# Patient Record
Sex: FEMALE | URBAN - METROPOLITAN AREA
[De-identification: names, ages, dates, MRNs, and addresses within clinical notes are randomized per-mention and may not be internally consistent; named-entity substitution may affect disease eponyms.]

---

## 2020-01-30 ENCOUNTER — NEW PATIENT (OUTPATIENT)
Dept: URBAN - METROPOLITAN AREA CLINIC 56 | Facility: CLINIC | Age: 60
End: 2020-01-30
Payer: COMMERCIAL

## 2020-01-30 DIAGNOSIS — Z83.511 FAMILY HISTORY OF GLAUCOMA: ICD-10-CM

## 2020-01-30 PROCEDURE — 92004 COMPRE OPH EXAM NEW PT 1/>: CPT | Performed by: OPTOMETRIST

## 2020-01-30 PROCEDURE — 92250 FUNDUS PHOTOGRAPHY W/I&R: CPT | Performed by: OPTOMETRIST

## 2020-01-30 PROCEDURE — 76514 ECHO EXAM OF EYE THICKNESS: CPT | Performed by: OPTOMETRIST

## 2020-01-30 ASSESSMENT — KERATOMETRY
OS: 45.70
OD: 45.43

## 2020-01-30 ASSESSMENT — INTRAOCULAR PRESSURE
OS: 10
OD: 10

## 2020-01-30 ASSESSMENT — VISUAL ACUITY
OD: 20/20
OS: 20/20

## 2020-09-11 ENCOUNTER — FOLLOW UP ESTABLISHED (OUTPATIENT)
Dept: URBAN - METROPOLITAN AREA CLINIC 56 | Facility: CLINIC | Age: 60
End: 2020-09-11
Payer: COMMERCIAL

## 2020-09-11 DIAGNOSIS — H33.322 ROUND HOLE, LEFT EYE: Primary | ICD-10-CM

## 2020-09-11 PROCEDURE — 92134 CPTRZ OPH DX IMG PST SGM RTA: CPT | Performed by: OPTOMETRIST

## 2020-09-11 PROCEDURE — 92014 COMPRE OPH EXAM EST PT 1/>: CPT | Performed by: OPTOMETRIST

## 2020-09-11 ASSESSMENT — INTRAOCULAR PRESSURE
OD: 12
OS: 12

## 2020-09-14 ENCOUNTER — NEW PATIENT (OUTPATIENT)
Dept: URBAN - METROPOLITAN AREA CLINIC 44 | Facility: CLINIC | Age: 60
End: 2020-09-14
Payer: COMMERCIAL

## 2020-09-14 PROCEDURE — 92134 CPTRZ OPH DX IMG PST SGM RTA: CPT | Performed by: OPHTHALMOLOGY

## 2020-09-14 PROCEDURE — 92004 COMPRE OPH EXAM NEW PT 1/>: CPT | Performed by: OPHTHALMOLOGY

## 2020-09-14 ASSESSMENT — INTRAOCULAR PRESSURE
OS: 14
OD: 13

## 2020-09-24 ENCOUNTER — FOLLOW UP ESTABLISHED (OUTPATIENT)
Dept: URBAN - METROPOLITAN AREA CLINIC 44 | Facility: CLINIC | Age: 60
End: 2020-09-24
Payer: COMMERCIAL

## 2020-09-24 PROCEDURE — 67145 PROPH RTA DTCHMNT PC: CPT | Performed by: OPHTHALMOLOGY

## 2020-09-24 ASSESSMENT — INTRAOCULAR PRESSURE
OD: 15
OS: 13

## 2020-09-28 ENCOUNTER — FOLLOW UP ESTABLISHED (OUTPATIENT)
Dept: URBAN - METROPOLITAN AREA CLINIC 44 | Facility: CLINIC | Age: 60
End: 2020-09-28
Payer: COMMERCIAL

## 2020-09-28 PROCEDURE — 67145 PROPH RTA DTCHMNT PC: CPT | Performed by: OPHTHALMOLOGY

## 2020-09-28 ASSESSMENT — INTRAOCULAR PRESSURE
OD: 14
OS: 14

## 2020-12-22 ENCOUNTER — FOLLOW UP ESTABLISHED (OUTPATIENT)
Dept: URBAN - METROPOLITAN AREA CLINIC 44 | Facility: CLINIC | Age: 60
End: 2020-12-22
Payer: COMMERCIAL

## 2020-12-22 DIAGNOSIS — H33.323 ROUND HOLE, BILATERAL: ICD-10-CM

## 2020-12-22 DIAGNOSIS — H25.13 AGE-RELATED NUCLEAR CATARACT, BILATERAL: ICD-10-CM

## 2020-12-22 DIAGNOSIS — H52.4 PRESBYOPIA: Primary | ICD-10-CM

## 2020-12-22 DIAGNOSIS — H43.813 VITREOUS DEGENERATION, BILATERAL: ICD-10-CM

## 2020-12-22 PROCEDURE — 92015 DETERMINE REFRACTIVE STATE: CPT | Performed by: OPTOMETRIST

## 2020-12-22 PROCEDURE — 92014 COMPRE OPH EXAM EST PT 1/>: CPT | Performed by: OPTOMETRIST

## 2020-12-22 ASSESSMENT — INTRAOCULAR PRESSURE
OS: 12
OD: 13

## 2020-12-22 ASSESSMENT — VISUAL ACUITY
OD: 20/25
OS: 20/20

## 2020-12-22 ASSESSMENT — KERATOMETRY
OD: 45.50
OS: 45.88

## 2021-03-29 ENCOUNTER — FOLLOW UP ESTABLISHED (OUTPATIENT)
Dept: URBAN - METROPOLITAN AREA CLINIC 44 | Facility: CLINIC | Age: 61
End: 2021-03-29
Payer: COMMERCIAL

## 2021-03-29 DIAGNOSIS — H35.412 LATTICE DEGENERATION OF RETINA, LEFT EYE: ICD-10-CM

## 2021-03-29 PROCEDURE — 92134 CPTRZ OPH DX IMG PST SGM RTA: CPT | Performed by: OPHTHALMOLOGY

## 2021-03-29 PROCEDURE — 92014 COMPRE OPH EXAM EST PT 1/>: CPT | Performed by: OPHTHALMOLOGY

## 2021-03-29 ASSESSMENT — INTRAOCULAR PRESSURE
OS: 15
OD: 16

## 2023-02-20 ENCOUNTER — OFFICE VISIT (OUTPATIENT)
Dept: URBAN - METROPOLITAN AREA CLINIC 44 | Facility: CLINIC | Age: 63
End: 2023-02-20
Payer: MEDICARE

## 2023-02-20 DIAGNOSIS — H43.813 VITREOUS DEGENERATION, BILATERAL: ICD-10-CM

## 2023-02-20 DIAGNOSIS — H35.412 LATTICE DEGENERATION OF RETINA, LEFT EYE: ICD-10-CM

## 2023-02-20 DIAGNOSIS — H33.323 ROUND HOLE, BILATERAL: Primary | ICD-10-CM

## 2023-02-20 PROCEDURE — 92134 CPTRZ OPH DX IMG PST SGM RTA: CPT | Performed by: OPHTHALMOLOGY

## 2023-02-20 PROCEDURE — 99214 OFFICE O/P EST MOD 30 MIN: CPT | Performed by: OPHTHALMOLOGY

## 2023-02-20 ASSESSMENT — INTRAOCULAR PRESSURE
OS: 10
OD: 12

## 2023-02-20 NOTE — IMPRESSION/PLAN
Impression: Round hole of retina, bilateral Plan: s/p laser to holes/lattice OU. no new tears, breaks, holes, or detachments. monitor RTC PRN retina

## 2024-04-01 ENCOUNTER — OFFICE VISIT (OUTPATIENT)
Dept: URBAN - METROPOLITAN AREA CLINIC 44 | Facility: CLINIC | Age: 64
End: 2024-04-01
Payer: MEDICARE

## 2024-04-01 DIAGNOSIS — H35.412 LATTICE DEGENERATION OF RETINA, LEFT EYE: ICD-10-CM

## 2024-04-01 DIAGNOSIS — H33.323 ROUND HOLE OF RETINA, BILATERAL: Primary | ICD-10-CM

## 2024-04-01 PROCEDURE — 99214 OFFICE O/P EST MOD 30 MIN: CPT | Performed by: OPHTHALMOLOGY

## 2024-04-01 ASSESSMENT — INTRAOCULAR PRESSURE
OS: 15
OD: 16

## 2024-05-09 ENCOUNTER — OFFICE VISIT (OUTPATIENT)
Dept: URBAN - METROPOLITAN AREA CLINIC 44 | Facility: CLINIC | Age: 64
End: 2024-05-09
Payer: MEDICARE

## 2024-05-09 DIAGNOSIS — H33.323 ROUND HOLE OF RETINA, BILATERAL: Primary | ICD-10-CM

## 2024-05-09 PROCEDURE — 67145 PROPH RTA DTCHMNT PC: CPT | Performed by: OPHTHALMOLOGY

## 2024-05-09 ASSESSMENT — INTRAOCULAR PRESSURE
OD: 10
OS: 11

## 2025-02-25 ENCOUNTER — APPOINTMENT (OUTPATIENT)
Dept: URBAN - METROPOLITAN AREA CLINIC 144 | Facility: CLINIC | Age: 65
Setting detail: DERMATOLOGY
End: 2025-02-25

## 2025-02-25 DIAGNOSIS — L82.1 OTHER SEBORRHEIC KERATOSIS: ICD-10-CM

## 2025-02-25 DIAGNOSIS — L81.4 OTHER MELANIN HYPERPIGMENTATION: ICD-10-CM

## 2025-02-25 DIAGNOSIS — Z71.89 OTHER SPECIFIED COUNSELING: ICD-10-CM

## 2025-02-25 DIAGNOSIS — D22 MELANOCYTIC NEVI: ICD-10-CM

## 2025-02-25 PROBLEM — D22.9 MELANOCYTIC NEVI, UNSPECIFIED: Status: ACTIVE | Noted: 2025-02-25

## 2025-02-25 PROCEDURE — ? COUNSELING

## 2025-02-25 PROCEDURE — 99203 OFFICE O/P NEW LOW 30 MIN: CPT

## 2025-02-25 ASSESSMENT — LOCATION DETAILED DESCRIPTION DERM
LOCATION DETAILED: LEFT PROXIMAL PRETIBIAL REGION
LOCATION DETAILED: LEFT SUPERIOR UPPER BACK
LOCATION DETAILED: LEFT CENTRAL MALAR CHEEK
LOCATION DETAILED: RIGHT SUPERIOR UPPER BACK
LOCATION DETAILED: RIGHT CENTRAL MALAR CHEEK

## 2025-02-25 ASSESSMENT — LOCATION SIMPLE DESCRIPTION DERM
LOCATION SIMPLE: LEFT UPPER BACK
LOCATION SIMPLE: RIGHT CHEEK
LOCATION SIMPLE: LEFT PRETIBIAL REGION
LOCATION SIMPLE: LEFT CHEEK
LOCATION SIMPLE: RIGHT UPPER BACK

## 2025-02-25 ASSESSMENT — LOCATION ZONE DERM
LOCATION ZONE: FACE
LOCATION ZONE: LEG
LOCATION ZONE: TRUNK

## 2025-04-03 ENCOUNTER — OFFICE VISIT (OUTPATIENT)
Dept: URBAN - METROPOLITAN AREA CLINIC 44 | Facility: CLINIC | Age: 65
End: 2025-04-03
Payer: MEDICARE

## 2025-04-03 DIAGNOSIS — H25.13 AGE-RELATED NUCLEAR CATARACT, BILATERAL: ICD-10-CM

## 2025-04-03 DIAGNOSIS — H33.323 ROUND HOLE OF RETINA, BILATERAL: Primary | ICD-10-CM

## 2025-04-03 DIAGNOSIS — H43.813 VITREOUS DEGENERATION, BILATERAL: ICD-10-CM

## 2025-04-03 DIAGNOSIS — H35.412 LATTICE DEGENERATION OF RETINA, LEFT EYE: ICD-10-CM

## 2025-04-03 PROCEDURE — 99214 OFFICE O/P EST MOD 30 MIN: CPT | Performed by: OPHTHALMOLOGY

## 2025-04-03 ASSESSMENT — INTRAOCULAR PRESSURE
OD: 20
OS: 17